# Patient Record
Sex: MALE | Race: WHITE | NOT HISPANIC OR LATINO
[De-identification: names, ages, dates, MRNs, and addresses within clinical notes are randomized per-mention and may not be internally consistent; named-entity substitution may affect disease eponyms.]

---

## 2023-03-06 ENCOUNTER — APPOINTMENT (OUTPATIENT)
Dept: ORTHOPEDIC SURGERY | Facility: CLINIC | Age: 48
End: 2023-03-06
Payer: COMMERCIAL

## 2023-03-06 DIAGNOSIS — S46.011A STRAIN OF MUSCLE(S) AND TENDON(S) OF THE ROTATOR CUFF OF RIGHT SHOULDER, INITIAL ENCOUNTER: ICD-10-CM

## 2023-03-06 DIAGNOSIS — Z78.9 OTHER SPECIFIED HEALTH STATUS: ICD-10-CM

## 2023-03-06 DIAGNOSIS — M47.812 SPONDYLOSIS W/OUT MYELOPATHY OR RADICULOPATHY, CERVICAL REGION: ICD-10-CM

## 2023-03-06 PROBLEM — Z00.00 ENCOUNTER FOR PREVENTIVE HEALTH EXAMINATION: Status: ACTIVE | Noted: 2023-03-06

## 2023-03-06 PROCEDURE — 73030 X-RAY EXAM OF SHOULDER: CPT | Mod: RT

## 2023-03-06 PROCEDURE — 72100 X-RAY EXAM L-S SPINE 2/3 VWS: CPT

## 2023-03-06 PROCEDURE — 99204 OFFICE O/P NEW MOD 45 MIN: CPT

## 2023-03-06 PROCEDURE — 72040 X-RAY EXAM NECK SPINE 2-3 VW: CPT

## 2023-03-06 PROCEDURE — 99072 ADDL SUPL MATRL&STAF TM PHE: CPT

## 2023-03-06 RX ORDER — CYCLOBENZAPRINE HYDROCHLORIDE 5 MG/1
5 TABLET, FILM COATED ORAL 3 TIMES DAILY
Qty: 15 | Refills: 0 | Status: ACTIVE | COMMUNITY
Start: 2023-03-06 | End: 1900-01-01

## 2023-03-06 RX ORDER — MELOXICAM 15 MG/1
15 TABLET ORAL
Qty: 90 | Refills: 0 | Status: ACTIVE | COMMUNITY
Start: 2023-03-06 | End: 1900-01-01

## 2023-03-06 RX ORDER — METHYLPREDNISOLONE 4 MG/1
4 TABLET ORAL
Qty: 1 | Refills: 0 | Status: ACTIVE | COMMUNITY
Start: 2023-03-06 | End: 1900-01-01

## 2023-03-06 NOTE — DISCUSSION/SUMMARY
[de-identified] : (Assessment)\par \par History, clinical examination and imaging were most consistent with:\par -right shoulder strain\par -cervical radiculopathy\par -lumbar strain status post lumbar fusion\par \par \par The diagnosis was explained in detail. The potential non-surgical and surgical treatments were reviewed. The relative risks and benefits of each option were considered relative to the patient’s age, activity level, medical history, symptom severity and previously attempted treatments. \par \par -Non-surgical treatment was selected. \par -The added clinical utility of an MRI was discussed. The patient deferred further diagnostic testing at this time to pursue non-surgical treatment.\par -We will begin with medication and PT. Should symptoms fail to improve we would consider cervical versus shoulder advanced imaging based on his symptoms. We discussed that the cervical spine is likely the primary cause given that he has neck pain and left hand numbness. \par -He will also seek a referral from a spine specialist for further evaluation of his lumbar and cervical spine given his complex lumbar history. \par \par (Plan)\par \par -Activity modification\par -Physical therapy: script provided\par -Methylprednisolone dose pack: taper as directed\par -Flexeril: three times daily as needed for spasms\par -Follow up: 6 weeks if failing to improve\par \par \par (MDM) \par \par Problem Complexity\par -Low: acute, uncomplicated injury \par \par Risk\par -Low: physical therapy\par -Moderate: prescription medication\par \par -Patient has not been seen by another provider in my practice within the past 2 years who specializes in orthopedic sports medicine, shoulder or elbow surgery. \par \par The patient was advised to consult with their primary medical provider prior to initiation of any new medications to reduce the risk of adverse effects specific to their long-term home medications and medical history. The risk of gastrointestinal irritation and kidney injury specific to long-term NSAID use was discussed.   \par \par

## 2023-03-06 NOTE — IMAGING
[Straightening consistent with spasm] : Straightening consistent with spasm [Facet arthropathy] : Facet arthropathy [Right] : right shoulder [There are no fractures, subluxations or dislocations. No significant abnormalities are seen] : There are no fractures, subluxations or dislocations. No significant abnormalities are seen [Type 2 acromion] : Type 2 acromion [de-identified] : (Shoulder Examination)\par \par Laterality\par -Right\par \par General\par -In no acute distress: yes\par -Alert and oriented to person, place and time: yes\par -Lymphadenopathy: no\par -Peripheral edema: no\par -Sensation grossly intact to light touch: yes\par -Capillary refill less than 2 seconds: yes \par \par Inspection\par -Skin intact: yes\par -Swelling: no\par -Atrophy: no\par -Scapular winging: no\par -AC deformity: no\par -Biceps deformity: no\par \par Tenderness to Palpation\par -Bicipital groove: yes\par -AC joint: no\par -Scapulothoracic: no\par \par Range of Motion\par -Active forward elevation: 120\par -Passive forward elevation: 170\par -External rotation at the side: 60\par -Internal rotation behind the back: sacrum\par \par Strength \par -Forward elevation: 5-\par -External rotation at the side: 5\par -Internal rotation at the side: 5\par -Deltoid: 5\par \par Special Tests\par -Harris: positive \par -O’Lokesh’s: positive \par -Speed’s: negative\par -Cross body adduction: negative\par -Apprehension and relocation: negative\par -Sulcus sign: negative\par -Belly press: negative\par \par \par (Cervical Spine Examination)\par \par Inspection\par -Skin intact: yes\par -Swelling: no\par -Gait normal: yes\par \par Palpation\par -Paraspinal tenderness: yes\par -Bony tenderness: no\par -Periscapular tenderness: no\par -Bony step-off: no\par \par Cervical Range of Motion\par -Restricted: no \par -Painful: yes\par \par Strength (bilateral unless otherwise noted)\par -Shoulder abduction: 5\par -Elbow flexion: 5\par -Elbow extension: 5\par -Wrist flexion: 5\par -Wrist extension: 5\par -Finger abduction: 5\par \par Sensation Intact to Light Touch (bilateral unless otherwise noted)\par -C5: yes\par -C6: yes\par -C7: yes\par -C8: yes\par -T1: yes\par \par Reflexes (bilateral unless otherwise noted)\par -Biceps: 2+\par -Triceps: 2+\par -Brachioradialis: 2+\par \par Special Tests (bilateral unless otherwise noted)\par -Sustained clonus: negative\par -Rush’s: negative\par -Spurling's: negative\par \par \par (Lumbar Spine Examination)\par \par Inspection\par -Skin intact: yes\par -Swelling: no\par -Gait normal: yes\par \par Palpation\par -Paraspinal tenderness: yes\par -Bony tenderness: no\par -Hip tenderness: no\par -Bony step-off: no\par \par Lumbar Range of Motion\par -Restricted: no \par -Painful: yes\par \par Strength (bilateral unless otherwise noted)\par -Hip flexion: 5\par -Hip abduction: 5\par -Knee extension: 5\par -Knee flexion: 5\par -Ankle dorsiflexion: 5\par -Ankle plantarflexion: 5\par -Great toe dorsiflexion: 5\par -Great toe plantarflexion: 5\par \par Sensation Intact to Light Touch (bilateral unless otherwise noted)\par -L3: yes\par -L4: yes\par -L5: yes\par -S1: yes\par \par Reflexes (bilateral unless otherwise noted)\par -Patellar: 2+\par -Achilles: 2+\par \par Special Tests (bilateral unless otherwise noted)\par -Sustained clonus: negative\par -Rush’s: negative\par -Straight leg raise: negative\par \par \par \par  [Implants in position] : Implants in position [No loosening of hardware] : No loosening of hardware [Fusion intact] : Fusion intact
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
## 2023-03-29 ENCOUNTER — APPOINTMENT (OUTPATIENT)
Dept: ORTHOPEDIC SURGERY | Facility: CLINIC | Age: 48
End: 2023-03-29

## 2023-04-17 ENCOUNTER — APPOINTMENT (OUTPATIENT)
Dept: ORTHOPEDIC SURGERY | Facility: CLINIC | Age: 48
End: 2023-04-17